# Patient Record
Sex: MALE | Race: OTHER | HISPANIC OR LATINO
[De-identification: names, ages, dates, MRNs, and addresses within clinical notes are randomized per-mention and may not be internally consistent; named-entity substitution may affect disease eponyms.]

---

## 2020-11-02 PROBLEM — Z00.00 ENCOUNTER FOR PREVENTIVE HEALTH EXAMINATION: Status: ACTIVE | Noted: 2020-11-02

## 2020-11-03 ENCOUNTER — APPOINTMENT (OUTPATIENT)
Dept: OTOLARYNGOLOGY | Facility: CLINIC | Age: 65
End: 2020-11-03
Payer: COMMERCIAL

## 2020-11-03 VITALS
OXYGEN SATURATION: 98 % | WEIGHT: 176 LBS | TEMPERATURE: 98.1 F | DIASTOLIC BLOOD PRESSURE: 90 MMHG | BODY MASS INDEX: 24.64 KG/M2 | SYSTOLIC BLOOD PRESSURE: 165 MMHG | HEART RATE: 61 BPM | HEIGHT: 71 IN

## 2020-11-03 DIAGNOSIS — Z78.9 OTHER SPECIFIED HEALTH STATUS: ICD-10-CM

## 2020-11-03 DIAGNOSIS — H93.13 TINNITUS, BILATERAL: ICD-10-CM

## 2020-11-03 DIAGNOSIS — Z86.79 PERSONAL HISTORY OF OTHER DISEASES OF THE CIRCULATORY SYSTEM: ICD-10-CM

## 2020-11-03 DIAGNOSIS — Z82.49 FAMILY HISTORY OF ISCHEMIC HEART DISEASE AND OTHER DISEASES OF THE CIRCULATORY SYSTEM: ICD-10-CM

## 2020-11-03 DIAGNOSIS — H91.90 UNSPECIFIED HEARING LOSS, UNSPECIFIED EAR: ICD-10-CM

## 2020-11-03 DIAGNOSIS — Z80.9 FAMILY HISTORY OF MALIGNANT NEOPLASM, UNSPECIFIED: ICD-10-CM

## 2020-11-03 DIAGNOSIS — H93.8X3 OTHER SPECIFIED DISORDERS OF EAR, BILATERAL: ICD-10-CM

## 2020-11-03 PROCEDURE — 99072 ADDL SUPL MATRL&STAF TM PHE: CPT

## 2020-11-03 PROCEDURE — 99204 OFFICE O/P NEW MOD 45 MIN: CPT | Mod: 25

## 2020-11-03 PROCEDURE — 92557 COMPREHENSIVE HEARING TEST: CPT

## 2020-11-03 PROCEDURE — 31231 NASAL ENDOSCOPY DX: CPT

## 2020-11-03 PROCEDURE — 92550 TYMPANOMETRY & REFLEX THRESH: CPT

## 2020-11-03 RX ORDER — ATORVASTATIN CALCIUM 20 MG/1
20 TABLET, FILM COATED ORAL
Refills: 0 | Status: ACTIVE | COMMUNITY

## 2020-11-03 RX ORDER — AMLODIPINE BESYLATE 5 MG/1
5 TABLET ORAL
Refills: 0 | Status: ACTIVE | COMMUNITY

## 2020-11-03 RX ORDER — SULFAMETHOXAZOLE AND TRIMETHOPRIM 800; 160 MG/1; MG/1
800-160 TABLET ORAL
Qty: 20 | Refills: 0 | Status: ACTIVE | COMMUNITY
Start: 2020-11-03 | End: 1900-01-01

## 2020-11-03 RX ORDER — TELMISARTAN AND HYDROCHLOROTHIAZIDE 80; 25 MG/1; MG/1
80-25 TABLET ORAL
Refills: 0 | Status: ACTIVE | COMMUNITY

## 2020-11-03 RX ORDER — ASPIRIN 81 MG
81 TABLET, DELAYED RELEASE (ENTERIC COATED) ORAL
Refills: 0 | Status: ACTIVE | COMMUNITY

## 2020-11-03 RX ORDER — CHLORTHALIDONE 25 MG/1
25 TABLET ORAL
Refills: 0 | Status: ACTIVE | COMMUNITY

## 2020-11-03 RX ORDER — FLUTICASONE PROPIONATE 50 UG/1
50 SPRAY, METERED NASAL DAILY
Qty: 1 | Refills: 2 | Status: ACTIVE | COMMUNITY
Start: 2020-11-03 | End: 1900-01-01

## 2020-11-03 NOTE — REASON FOR VISIT
[Initial Evaluation] : an initial evaluation for [FreeTextEntry2] : tinnitus, plugged ears, reverberation of sound

## 2020-11-03 NOTE — PHYSICAL EXAM
[Nasal Endoscopy Performed] : nasal endoscopy was performed, see procedure section for findings [Midline] : trachea located in midline position [Normal] : no rashes [de-identified] : mild ts b

## 2020-11-03 NOTE — HISTORY OF PRESENT ILLNESS
[de-identified] : 66 yo man with b high pitched continuous tinnitus, reverberation of sound l>r and dizziness with positional change. He has had htn crises up to 270 mmhg systolic and his internist thought the dizziness caused this. He feels the opposite. No fh relevant to cc. nonsmoker. He is also concerned about his hearing. Had covid-19 about 7 months ago. here with his s.o.

## 2020-11-03 NOTE — PROCEDURE
[Posterior Lesion] : posterior lesion [Anterior rhinoscopy insufficient to account for symptoms] : anterior rhinoscopy insufficient to account for symptoms [Flexible Endoscope] : examined with the flexible endoscope [Serial Number: ___] : Serial Number: [unfilled] [Congested] : congested [Paranasal Sinuses Middle Meatus] : bilateral purulence [Normal] : the paranasal sinuses had no abnormalities [FreeTextEntry6] : b purulence from omus drying in nose

## 2020-11-24 ENCOUNTER — APPOINTMENT (OUTPATIENT)
Dept: OTOLARYNGOLOGY | Facility: CLINIC | Age: 65
End: 2020-11-24
Payer: COMMERCIAL

## 2020-11-24 VITALS
HEART RATE: 65 BPM | TEMPERATURE: 98.1 F | SYSTOLIC BLOOD PRESSURE: 151 MMHG | DIASTOLIC BLOOD PRESSURE: 90 MMHG | OXYGEN SATURATION: 97 %

## 2020-11-24 PROCEDURE — 99213 OFFICE O/P EST LOW 20 MIN: CPT

## 2020-11-24 PROCEDURE — 92540 BASIC VESTIBULAR EVALUATION: CPT

## 2020-11-24 PROCEDURE — 92537 CALORIC VSTBLR TEST W/REC: CPT

## 2020-11-24 RX ORDER — TRIAMTERENE 50 MG/1
50 CAPSULE ORAL
Qty: 30 | Refills: 0 | Status: ACTIVE | COMMUNITY
Start: 2020-09-11

## 2020-11-24 RX ORDER — CLARITHROMYCIN 500 MG/1
500 TABLET, FILM COATED ORAL
Qty: 28 | Refills: 0 | Status: ACTIVE | COMMUNITY
Start: 2020-11-04

## 2020-11-24 RX ORDER — HYDROCHLOROTHIAZIDE 25 MG/1
25 TABLET ORAL
Qty: 30 | Refills: 0 | Status: ACTIVE | COMMUNITY
Start: 2020-09-11

## 2020-11-24 RX ORDER — AMLODIPINE BESYLATE 2.5 MG/1
2.5 TABLET ORAL
Qty: 30 | Refills: 0 | Status: ACTIVE | COMMUNITY
Start: 2020-10-05

## 2020-11-24 RX ORDER — TRIAMTERENE AND HYDROCHLOROTHIAZIDE 25; 37.5 MG/1; MG/1
37.5-25 TABLET ORAL
Qty: 30 | Refills: 0 | Status: ACTIVE | COMMUNITY
Start: 2020-06-03

## 2020-11-24 RX ORDER — AMOXICILLIN 875 MG/1
875 TABLET, FILM COATED ORAL
Qty: 28 | Refills: 0 | Status: ACTIVE | COMMUNITY
Start: 2020-11-04

## 2020-11-24 RX ORDER — LOSARTAN POTASSIUM 100 MG/1
100 TABLET, FILM COATED ORAL
Qty: 30 | Refills: 0 | Status: ACTIVE | COMMUNITY
Start: 2020-04-08

## 2020-11-24 RX ORDER — METOPROLOL SUCCINATE 25 MG/1
25 TABLET, EXTENDED RELEASE ORAL
Qty: 90 | Refills: 0 | Status: ACTIVE | COMMUNITY
Start: 2020-11-02

## 2020-11-24 RX ORDER — TADALAFIL 20 MG/1
20 TABLET ORAL
Qty: 30 | Refills: 0 | Status: ACTIVE | COMMUNITY
Start: 2020-11-04

## 2020-11-24 RX ORDER — PANTOPRAZOLE 40 MG/1
40 TABLET, DELAYED RELEASE ORAL
Qty: 30 | Refills: 0 | Status: ACTIVE | COMMUNITY
Start: 2020-11-04

## 2020-11-24 RX ORDER — LEVOFLOXACIN 500 MG/1
500 TABLET, FILM COATED ORAL
Qty: 10 | Refills: 0 | Status: ACTIVE | COMMUNITY
Start: 2020-08-04

## 2020-11-24 RX ORDER — FAMCICLOVIR 500 MG/1
500 TABLET, FILM COATED ORAL
Qty: 20 | Refills: 0 | Status: ACTIVE | COMMUNITY
Start: 2020-04-14

## 2020-11-24 RX ORDER — MECLIZINE HYDROCHLORIDE 25 MG/1
25 TABLET ORAL
Qty: 30 | Refills: 0 | Status: ACTIVE | COMMUNITY
Start: 2020-09-11

## 2020-11-24 NOTE — HISTORY OF PRESENT ILLNESS
[de-identified] : followup 64 yo man with episodic vertigo and htn. he explains his md thinks he has a bone spur in his neck causing the vertigo. he had vng and is here to review the results. No attacks since last seen here 3 weeks ago.

## 2020-11-30 ENCOUNTER — NON-APPOINTMENT (OUTPATIENT)
Age: 65
End: 2020-11-30

## 2020-12-03 ENCOUNTER — APPOINTMENT (OUTPATIENT)
Dept: OTOLARYNGOLOGY | Facility: CLINIC | Age: 65
End: 2020-12-03

## 2020-12-03 ENCOUNTER — APPOINTMENT (OUTPATIENT)
Dept: OTOLARYNGOLOGY | Facility: CLINIC | Age: 65
End: 2020-12-03
Payer: COMMERCIAL

## 2020-12-03 VITALS
OXYGEN SATURATION: 52 % | TEMPERATURE: 98.5 F | HEART RATE: 52 BPM | DIASTOLIC BLOOD PRESSURE: 90 MMHG | SYSTOLIC BLOOD PRESSURE: 170 MMHG | RESPIRATION RATE: 18 BRPM

## 2020-12-03 PROCEDURE — 99213 OFFICE O/P EST LOW 20 MIN: CPT

## 2020-12-03 PROCEDURE — 99072 ADDL SUPL MATRL&STAF TM PHE: CPT

## 2020-12-03 PROCEDURE — 92586: CPT

## 2020-12-03 NOTE — HISTORY OF PRESENT ILLNESS
[de-identified] : followup 66 yo man with dizziness and sinus trouble he has been treated for his sinuses and is on flonase and prefers to have his dizziness addressed first. He had mri of his head today and is here to review. Due to neck pain associated with the dizziness he had cspine mri ordered also but it has not been done yet. The dizziness can be severe.

## 2020-12-03 NOTE — DATA REVIEWED
[de-identified] : vemp normal b [de-identified] : mri questioned sscd, also sinnus congestionand mild wm change

## 2020-12-03 NOTE — REASON FOR VISIT
[Subsequent Evaluation] : a subsequent evaluation for [FreeTextEntry2] : dizziness and sinus trouble

## 2020-12-04 ENCOUNTER — APPOINTMENT (OUTPATIENT)
Dept: OTOLARYNGOLOGY | Facility: CLINIC | Age: 65
End: 2020-12-04
Payer: COMMERCIAL

## 2020-12-04 VITALS
SYSTOLIC BLOOD PRESSURE: 164 MMHG | DIASTOLIC BLOOD PRESSURE: 88 MMHG | TEMPERATURE: 98.1 F | OXYGEN SATURATION: 98 % | HEART RATE: 52 BPM

## 2020-12-04 PROCEDURE — 99214 OFFICE O/P EST MOD 30 MIN: CPT

## 2020-12-04 PROCEDURE — 99072 ADDL SUPL MATRL&STAF TM PHE: CPT

## 2020-12-04 NOTE — REASON FOR VISIT
[Subsequent Evaluation] : a subsequent evaluation for [FreeTextEntry2] : followup dizziness and sinus congestion

## 2020-12-04 NOTE — HISTORY OF PRESENT ILLNESS
[de-identified] : followup 66 yo man with dizziness and sinus congestion - he has had abx for h pylori but sinus congestion persists. He only gets dizzy when moves his neck and he always has pain in the neck when he moves it if he gets dizzy. he had cspine mri and is here to review the report. he had brain mri yesterday showing some sinus opacificaiton.

## 2020-12-04 NOTE — DATA REVIEWED
[de-identified] : mri reviewed with pt and Dr Combs - has mild abnormalities at many levels and some flattening of the sac and disc protrusion

## 2020-12-18 ENCOUNTER — APPOINTMENT (OUTPATIENT)
Dept: OTOLARYNGOLOGY | Facility: CLINIC | Age: 65
End: 2020-12-18
Payer: COMMERCIAL

## 2020-12-18 VITALS
SYSTOLIC BLOOD PRESSURE: 146 MMHG | OXYGEN SATURATION: 98 % | DIASTOLIC BLOOD PRESSURE: 89 MMHG | HEART RATE: 49 BPM | TEMPERATURE: 97.9 F

## 2020-12-18 DIAGNOSIS — J32.2 CHRONIC ETHMOIDAL SINUSITIS: ICD-10-CM

## 2020-12-18 DIAGNOSIS — J34.2 DEVIATED NASAL SEPTUM: ICD-10-CM

## 2020-12-18 PROCEDURE — 99214 OFFICE O/P EST MOD 30 MIN: CPT

## 2020-12-18 PROCEDURE — 99072 ADDL SUPL MATRL&STAF TM PHE: CPT

## 2020-12-18 NOTE — DATA REVIEWED
[de-identified] : ct reviewed images and report with pt - l ds with spur and turb hypertrphy, chronic ethmoid sinusitis

## 2020-12-18 NOTE — HISTORY OF PRESENT ILLNESS
[de-identified] : 64 yo man following up for nasal obstruction and chronic sinusitis. He relates b nasal congestion and drainage in AMs but as the day goes on the blockage is in his left side of his nose. He took the abx and spray as prescribed (bactrim/flonase) and is improved but sx still persist to a moderate degree. Dizziness is being managed by ortho (cevical vertigo).

## 2020-12-18 NOTE — PHYSICAL EXAM
[] : septum deviated to the left [de-identified] : congested [de-identified] : congested [Normal] : no rashes

## 2021-01-19 ENCOUNTER — APPOINTMENT (OUTPATIENT)
Dept: OTOLARYNGOLOGY | Facility: AMBULATORY SURGERY CENTER | Age: 66
End: 2021-01-19

## 2022-01-19 ENCOUNTER — RESULT REVIEW (OUTPATIENT)
Age: 67
End: 2022-01-19

## 2022-01-24 ENCOUNTER — RESULT REVIEW (OUTPATIENT)
Age: 67
End: 2022-01-24

## 2022-01-25 ENCOUNTER — APPOINTMENT (OUTPATIENT)
Dept: SPINE | Facility: CLINIC | Age: 67
End: 2022-01-25
Payer: COMMERCIAL

## 2022-01-25 ENCOUNTER — OUTPATIENT (OUTPATIENT)
Dept: OUTPATIENT SERVICES | Facility: HOSPITAL | Age: 67
LOS: 1 days | End: 2022-01-25
Payer: COMMERCIAL

## 2022-01-25 VITALS
OXYGEN SATURATION: 98 % | TEMPERATURE: 98 F | DIASTOLIC BLOOD PRESSURE: 96 MMHG | HEIGHT: 71 IN | RESPIRATION RATE: 18 BRPM | WEIGHT: 181 LBS | SYSTOLIC BLOOD PRESSURE: 154 MMHG | BODY MASS INDEX: 25.34 KG/M2 | HEART RATE: 61 BPM

## 2022-01-25 DIAGNOSIS — R42 DIZZINESS AND GIDDINESS: ICD-10-CM

## 2022-01-25 DIAGNOSIS — M47.812 SPONDYLOSIS W/OUT MYELOPATHY OR RADICULOPATHY, CERVICAL REGION: ICD-10-CM

## 2022-01-25 DIAGNOSIS — M51.36 OTHER INTERVERTEBRAL DISC DEGENERATION, LUMBAR REGION: ICD-10-CM

## 2022-01-25 PROCEDURE — 99205 OFFICE O/P NEW HI 60 MIN: CPT

## 2022-01-25 PROCEDURE — 72114 X-RAY EXAM L-S SPINE BENDING: CPT

## 2022-01-25 PROCEDURE — 72114 X-RAY EXAM L-S SPINE BENDING: CPT | Mod: 26

## 2022-01-25 PROCEDURE — 72052 X-RAY EXAM NECK SPINE 6/>VWS: CPT

## 2022-01-25 PROCEDURE — 72052 X-RAY EXAM NECK SPINE 6/>VWS: CPT | Mod: 26

## 2022-01-25 NOTE — PHYSICAL EXAM
[Normal] : Gait: normal [Santoro's Sign] : negative Santoro's sign [UE/LE] : Sensory: Intact in bilateral upper & lower extremities

## 2022-01-25 NOTE — DISCUSSION/SUMMARY
[de-identified] : I had a long discussion with him regarding the natural history of cervical vertigo. We reviewed his MRI in great detail including showing the location of the posterior paraspinal lipoma which is miles away from the jugular vein or any other neurovascular structures. I explained to him that it was highly unlikely that he had a cervical vertigo and given the beneficial appearance of his x-rays with minimal spondylosis I do not recommend further treatment at this time. I answered all of his questions to the best of my ability and given my contact information including my cell phone and email address should you have any further questions in the future.\par \par Orlando Hogan M.D., M.Sc.\par \par Department of Neurosurgery\par [facility]\par [facility]\par [facility]\par [place], [place]\par [email or web address]\par [phone]\par

## 2022-01-25 NOTE — HISTORY OF PRESENT ILLNESS
[de-identified] : The patient is a 66-year-old man treated by 1 of my colleagues in otolaryngology. He has had some complaints of vertigo when moving his neck additionally he had an MRI which revealed a cervical lipoma. He presents for further treatment and management recommendations